# Patient Record
Sex: FEMALE | Race: WHITE
[De-identification: names, ages, dates, MRNs, and addresses within clinical notes are randomized per-mention and may not be internally consistent; named-entity substitution may affect disease eponyms.]

---

## 2019-10-13 ENCOUNTER — HOSPITAL ENCOUNTER (EMERGENCY)
Dept: HOSPITAL 39 - ER | Age: 39
Discharge: HOME | End: 2019-10-13
Payer: SELF-PAY

## 2019-10-13 VITALS — OXYGEN SATURATION: 97 %

## 2019-10-13 VITALS — SYSTOLIC BLOOD PRESSURE: 152 MMHG | TEMPERATURE: 98.1 F | DIASTOLIC BLOOD PRESSURE: 93 MMHG
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
## 2019-10-13 DIAGNOSIS — S89.92XA: Primary | ICD-10-CM

## 2019-10-13 DIAGNOSIS — Z87.891: ICD-10-CM

## 2019-10-13 NOTE — ED.PDOC
History of Present Illness





- General


Chief Complaint: General


Stated Complaint: L knee discomfort


Time Seen by Provider: 10/13/19 07:38





- History of Present Illness


Initial Comments: 





c/o having L knee paina nd swelling with decrease range of motion since 1 month 

started when she fell down , initial X-ray was normal as per patient , taking 

ibuprofen , but still having pain and swelling , seeing out pt doctor 

recommended MRI which she will do after 1 week 


Severity: severe


Improving Factors: nothing


Worsening Factors: movement


Associated Symptoms: denies symptoms


Allergies/Adverse Reactions: 


Allergies





NO KNOWN ALLERGY Allergy (Verified 10/13/19 07:18)


   





Home Medications: 


Ambulatory Orders





NK  10/13/19 











Review of Systems





- Review of Systems


Constitutional: States: no symptoms reported


EENTM: States: no symptoms reported


Respiratory: States: no symptoms reported


Cardiology: States: no symptoms reported


Gastrointestinal/Abdominal: States: no symptoms reported


Genitourinary: States: no symptoms reported


Musculoskeletal: States: see HPI


Skin: States: no symptoms reported


Neurological: States: no symptoms reported


Endocrine: States: no symptoms reported


Hematologic/Lymphatic: States: no symptoms reported


All other Systems: Reviewed and Negative





Past Medical History (General)





- Patient Medical History


Hx Stroke: No


Hx Congestive Heart Failure: No


Hx Diabetes: No


Hx MRSA: No





- Vaccination History


Hx Influenza Vaccination: No


Hx Pneumococcal Vaccination: No





- Social History


Hx Tobacco Use: Yes


Hx Alcohol Use: No


Hx Substance Use: No





- Female History


Patient is a Female of Child Bearing Age (10 -59 yrs old): Yes


Patient Pregnant: No





Family Medical History





- Family History


  ** Mother


Living Status: Still Living


Hx Family Hypertension: Yes


Hx Family Diabetes: Yes


Hx Family;Other: Fibromyalgia





Physical Exam





- Physical Exam


General Appearance: Alert


Eye Exam: bilateral normal


Ears, Nose, Throat: hearing grossly normal


Neck: non-tender, full range of motion, supple


Back Exam: normal inspection


Extremity: swelling - and tenderness over the L Knee with decrease range of 

motion 





Departure





- Departure


Clinical Impression: 


 Knee injury, Knee pain





Time of Disposition: 07:42


Disposition: Discharge to Home or Self Care


Condition: Good


Departure Forms:  ED Discharge - Pt. Copy, Patient Portal Self Enrollment


Diet: resume usual diet


Activity: increase activity as tolerated


Referrals: 


UNKNOWN,PHYSICIAN [Primary Care Provider] - 1-2 Weeks


Home Medications: 


Ambulatory Orders





NK  10/13/19 








Additional Instructions: 


Refer to ortho 


Continue Naprosyn 


Follow up with MRI and PCP as soon as possible